# Patient Record
Sex: MALE | Race: WHITE | NOT HISPANIC OR LATINO
[De-identification: names, ages, dates, MRNs, and addresses within clinical notes are randomized per-mention and may not be internally consistent; named-entity substitution may affect disease eponyms.]

---

## 2017-08-13 ENCOUNTER — HOSPITAL (OUTPATIENT)
Dept: OTHER | Age: 37
End: 2017-08-13
Attending: EMERGENCY MEDICINE

## 2017-08-13 LAB
ALBUMIN SERPL-MCNC: 3.7 GM/DL (ref 3.6–5.1)
ALP SERPL-CCNC: 81 UNIT/L (ref 45–117)
ALT SERPL-CCNC: 26 UNIT/L
AMORPH SED URNS QL MICRO: ABNORMAL
ANALYZER ANC (IANC): ABNORMAL
ANION GAP SERPL CALC-SCNC: 12 MMOL/L (ref 10–20)
APPEARANCE UR: CLEAR
AST SERPL-CCNC: 15 UNIT/L
BACTERIA #/AREA URNS HPF: ABNORMAL /HPF
BASOPHILS # BLD: 0.1 THOUSAND/MCL (ref 0–0.3)
BASOPHILS NFR BLD: 1 %
BILIRUB CONJ SERPL-MCNC: 0.1 MG/DL (ref 0–0.2)
BILIRUB SERPL-MCNC: 0.4 MG/DL (ref 0.2–1)
BILIRUB UR QL: NEGATIVE
BUN SERPL-MCNC: 11 MG/DL (ref 6–20)
BUN/CREAT SERPL: 15 (ref 7–25)
CALCIUM SERPL-MCNC: 9.2 MG/DL (ref 8.4–10.2)
CAOX CRY URNS QL MICRO: ABNORMAL
CHLORIDE: 104 MMOL/L (ref 98–107)
CO2 SERPL-SCNC: 29 MMOL/L (ref 21–32)
COLOR UR: YELLOW
CREAT SERPL-MCNC: 0.73 MG/DL (ref 0.67–1.17)
DIFFERENTIAL METHOD BLD: ABNORMAL
EOSINOPHIL # BLD: 0.2 THOUSAND/MCL (ref 0.1–0.5)
EOSINOPHIL NFR BLD: 2 %
EPITH CASTS #/AREA URNS LPF: ABNORMAL /[LPF]
ERYTHROCYTE [DISTWIDTH] IN BLOOD: 12.5 % (ref 11–15)
FATTY CASTS #/AREA URNS LPF: ABNORMAL /[LPF]
GLUCOSE SERPL-MCNC: 100 MG/DL (ref 65–99)
GLUCOSE UR-MCNC: NEGATIVE MG/DL
GRAN CASTS #/AREA URNS LPF: ABNORMAL /[LPF]
HEMATOCRIT: 46.5 % (ref 39–51)
HGB BLD-MCNC: 16.3 GM/DL (ref 13–17)
HGB UR QL: ABNORMAL
HYALINE CASTS #/AREA URNS LPF: ABNORMAL /LPF (ref 0–5)
KETONES UR-MCNC: ABNORMAL MG/DL
LEUKOCYTE ESTERASE UR QL STRIP: NEGATIVE
LIPASE SERPL-CCNC: 98 UNIT/L (ref 73–393)
LYMPHOCYTES # BLD: 2.4 THOUSAND/MCL (ref 1–4.8)
LYMPHOCYTES NFR BLD: 20 %
MCH RBC QN AUTO: 32.1 PG (ref 26–34)
MCHC RBC AUTO-ENTMCNC: 35.1 GM/DL (ref 32–36.5)
MCV RBC AUTO: 91.7 FL (ref 78–100)
MIXED CELL CASTS #/AREA URNS LPF: ABNORMAL /[LPF]
MONOCYTES # BLD: 1.4 THOUSAND/MCL (ref 0.3–0.9)
MONOCYTES NFR BLD: 12 %
MUCOUS THREADS URNS QL MICRO: PRESENT
NEUTROPHILS # BLD: 7.9 THOUSAND/MCL (ref 1.8–7.7)
NEUTROPHILS NFR BLD: 65 %
NEUTS SEG NFR BLD: ABNORMAL %
NITRITE UR QL: NEGATIVE
PERCENT NRBC: ABNORMAL
PH UR: 5 UNIT (ref 5–7)
PLATELET # BLD: 365 THOUSAND/MCL (ref 140–450)
POTASSIUM SERPL-SCNC: 4.3 MMOL/L (ref 3.4–5.1)
PROT SERPL-MCNC: 7.7 GM/DL (ref 6.4–8.2)
PROT UR QL: NEGATIVE MG/DL
RBC # BLD: 5.07 MILLION/MCL (ref 4.5–5.9)
RBC #/AREA URNS HPF: ABNORMAL /HPF (ref 0–3)
RBC CASTS #/AREA URNS LPF: ABNORMAL /[LPF]
RENAL EPI CELLS #/AREA URNS HPF: ABNORMAL /[HPF]
SODIUM SERPL-SCNC: 141 MMOL/L (ref 135–145)
SP GR UR: 1.02 (ref 1–1.03)
SPECIMEN SOURCE: ABNORMAL
SPERM URNS QL MICRO: ABNORMAL
SQUAMOUS #/AREA URNS HPF: ABNORMAL /HPF (ref 0–5)
T VAGINALIS URNS QL MICRO: ABNORMAL
TRI-PHOS CRY URNS QL MICRO: ABNORMAL
URATE CRY URNS QL MICRO: ABNORMAL
URINE REFLEX: ABNORMAL
URNS CMNT MICRO: ABNORMAL
UROBILINOGEN UR QL: 0.2 MG/DL (ref 0–1)
WAXY CASTS #/AREA URNS LPF: ABNORMAL /[LPF]
WBC # BLD: 12 THOUSAND/MCL (ref 4.2–11)
WBC #/AREA URNS HPF: ABNORMAL /HPF (ref 0–5)
WBC CASTS #/AREA URNS LPF: ABNORMAL /[LPF]
YEAST HYPHAE URNS QL MICRO: ABNORMAL
YEAST URNS QL MICRO: ABNORMAL

## 2021-11-25 ENCOUNTER — HOSPITAL ENCOUNTER (OUTPATIENT)
Age: 41
Discharge: HOME | End: 2021-11-25
Payer: COMMERCIAL

## 2021-11-25 VITALS
SYSTOLIC BLOOD PRESSURE: 141 MMHG | HEIGHT: 67 IN | HEART RATE: 97 BPM | RESPIRATION RATE: 16 BRPM | DIASTOLIC BLOOD PRESSURE: 96 MMHG | WEIGHT: 165 LBS | BODY MASS INDEX: 25.9 KG/M2 | TEMPERATURE: 99 F | OXYGEN SATURATION: 98 %

## 2021-11-25 DIAGNOSIS — Z20.822 ENCOUNTER FOR LABORATORY TESTING FOR COVID-19 VIRUS: Primary | ICD-10-CM

## 2021-11-25 PROCEDURE — 99212 OFFICE O/P EST SF 10 MIN: CPT | Performed by: NURSE PRACTITIONER

## 2021-11-25 PROCEDURE — U0002 COVID-19 LAB TEST NON-CDC: HCPCS | Performed by: NURSE PRACTITIONER

## 2021-11-25 NOTE — ED PROVIDER NOTES
Patient Seen in: [facility]      History   Patient presents with:  Testing    Stated Complaint: covid test    Subjective:   42-year-old male presents to immediate care today for Covid testing.   Reports his wife and 2 of his children are posi Cardiovascular:      Rate and Rhythm: Normal rate. Pulses: Normal pulses. Pulmonary:      Effort: Pulmonary effort is normal.      Breath sounds: Normal breath sounds. Abdominal:      General: Abdomen is flat.    Musculoskeletal:         General:

## 2021-11-26 ENCOUNTER — HOSPITAL ENCOUNTER (OUTPATIENT)
Age: 41
Discharge: HOME | End: 2021-11-26
Payer: COMMERCIAL

## 2021-11-26 VITALS
HEART RATE: 120 BPM | SYSTOLIC BLOOD PRESSURE: 127 MMHG | HEIGHT: 67 IN | TEMPERATURE: 100 F | WEIGHT: 165 LBS | RESPIRATION RATE: 18 BRPM | BODY MASS INDEX: 25.9 KG/M2 | OXYGEN SATURATION: 99 % | DIASTOLIC BLOOD PRESSURE: 7 MMHG

## 2021-11-26 DIAGNOSIS — Z20.822 ENCOUNTER FOR LABORATORY TESTING FOR COVID-19 VIRUS: Primary | ICD-10-CM

## 2021-11-26 DIAGNOSIS — U07.1 COVID: ICD-10-CM

## 2021-11-26 PROCEDURE — 99212 OFFICE O/P EST SF 10 MIN: CPT | Performed by: NURSE PRACTITIONER

## 2021-11-26 PROCEDURE — U0002 COVID-19 LAB TEST NON-CDC: HCPCS | Performed by: NURSE PRACTITIONER

## 2021-11-26 NOTE — ED QUICK NOTES
Pt refused the monoclonal antibiodies at this time d/t family obligations (has to care for his children). Will return tomorrow at 12:30 for infusion.  Instructed him to come 15 min early, call the registration phone number provided and remain in his car unt

## 2021-11-26 NOTE — ED PROVIDER NOTES
Patient Seen in: [facility]      History   Patient presents with:  Covid-19 Test    Stated Complaint: Covid Test    Subjective:   HPI    46-year-old male presents to the immediate care requesting Covid testing.   He states his wife tested pos were discussed  Low-grade fever with elevated heart rate in the immediate care patient will take Tylenol at home  Discussed monoclonal antibody treatment patient [place] an [place] for [place] [facility] [place]-declines infusion at this time  For chest

## 2021-11-26 NOTE — ED INITIAL ASSESSMENT (HPI)
Pt presents to the IC with c/o wanting a covid test. Pt tested negative for covid yesterday but pt's wife tested positive. Pt was asymtomatic when tested yesterday but today has body aches and chills.

## 2021-11-27 ENCOUNTER — NURSE ONLY (OUTPATIENT)
Dept: INFUSION CENTER | Age: 41
End: 2021-11-27
Attending: FAMILY MEDICINE
Payer: COMMERCIAL

## 2021-11-27 VITALS
HEIGHT: 67 IN | SYSTOLIC BLOOD PRESSURE: 128 MMHG | WEIGHT: 165 LBS | BODY MASS INDEX: 25.9 KG/M2 | OXYGEN SATURATION: 98 % | DIASTOLIC BLOOD PRESSURE: 83 MMHG | TEMPERATURE: 99 F | RESPIRATION RATE: 18 BRPM | HEART RATE: 88 BPM

## 2021-11-27 NOTE — PROGRESS NOTES
1220  Pt arrived for infusion. Education given on IV start and medication infusion. 1237  Infusion started. Pt resting on cart with lights dimmed. Will continue to monitor. 1257  Infusion complete. Line flushed with Normal Saline.  Patient tolerated

## 2021-11-29 ENCOUNTER — TELEPHONE (OUTPATIENT)
Dept: CASE MANAGEMENT | Age: 41
End: 2021-11-29